# Patient Record
Sex: MALE | Race: WHITE | Employment: OTHER | URBAN - METROPOLITAN AREA
[De-identification: names, ages, dates, MRNs, and addresses within clinical notes are randomized per-mention and may not be internally consistent; named-entity substitution may affect disease eponyms.]

---

## 2017-05-23 ENCOUNTER — HOSPITAL ENCOUNTER (OUTPATIENT)
Dept: OTHER | Age: 42
Discharge: HOME | End: 2017-05-23
Attending: INTERNAL MEDICINE | Admitting: INTERNAL MEDICINE

## 2017-05-23 ENCOUNTER — OFFICE VISIT (OUTPATIENT)
Dept: PULMONOLOGY | Age: 42
End: 2017-05-23

## 2017-05-23 VITALS
HEIGHT: 74 IN | OXYGEN SATURATION: 98 % | DIASTOLIC BLOOD PRESSURE: 80 MMHG | TEMPERATURE: 97.7 F | RESPIRATION RATE: 16 BRPM | WEIGHT: 223.2 LBS | SYSTOLIC BLOOD PRESSURE: 118 MMHG | HEART RATE: 105 BPM | BODY MASS INDEX: 28.64 KG/M2

## 2017-05-23 DIAGNOSIS — J44.9 COPD, SEVERE (HCC): ICD-10-CM

## 2017-05-23 DIAGNOSIS — R06.02 SOB (SHORTNESS OF BREATH): Primary | ICD-10-CM

## 2017-05-23 DIAGNOSIS — Z72.0 TOBACCO ABUSE: ICD-10-CM

## 2017-05-23 DIAGNOSIS — R06.02 BREATH SHORTNESS: ICD-10-CM

## 2017-05-23 PROCEDURE — 99214 OFFICE O/P EST MOD 30 MIN: CPT | Performed by: INTERNAL MEDICINE

## 2017-05-23 RX ORDER — OMEPRAZOLE 40 MG/1
CAPSULE, DELAYED RELEASE ORAL
COMMUNITY
Start: 2017-05-01

## 2017-05-23 RX ORDER — ALBUTEROL SULFATE 90 UG/1
2 AEROSOL, METERED RESPIRATORY (INHALATION) EVERY 6 HOURS PRN
Qty: 1 INHALER | Refills: 5 | Status: SHIPPED | OUTPATIENT
Start: 2017-05-23 | End: 2022-01-24

## 2017-05-23 RX ORDER — BUSPIRONE HYDROCHLORIDE 5 MG/1
TABLET ORAL
COMMUNITY
Start: 2017-05-05 | End: 2022-01-24

## 2017-05-23 RX ORDER — HYDROXYZINE HYDROCHLORIDE 25 MG/1
TABLET, FILM COATED ORAL
COMMUNITY
Start: 2017-05-05 | End: 2022-01-24

## 2017-08-29 ENCOUNTER — TELEPHONE (OUTPATIENT)
Dept: PULMONOLOGY | Age: 42
End: 2017-08-29

## 2019-06-27 ENCOUNTER — HOSPITAL ENCOUNTER (OUTPATIENT)
Dept: GENERAL RADIOLOGY | Age: 44
Discharge: HOME | End: 2019-06-27
Payer: MEDICARE

## 2019-06-27 ENCOUNTER — HOSPITAL ENCOUNTER (OUTPATIENT)
Age: 44
Discharge: HOME | End: 2019-06-27
Payer: MEDICARE

## 2019-06-27 DIAGNOSIS — J44.9 CHRONIC OBSTRUCTIVE PULMONARY DISEASE, UNSPECIFIED COPD TYPE (HCC): ICD-10-CM

## 2019-06-27 PROCEDURE — 71046 X-RAY EXAM CHEST 2 VIEWS: CPT

## 2022-01-24 ENCOUNTER — HOSPITAL ENCOUNTER (EMERGENCY)
Age: 47
Discharge: HOME | End: 2022-01-24
Attending: STUDENT IN AN ORGANIZED HEALTH CARE EDUCATION/TRAINING PROGRAM
Payer: COMMERCIAL

## 2022-01-24 VITALS
SYSTOLIC BLOOD PRESSURE: 151 MMHG | TEMPERATURE: 97.6 F | DIASTOLIC BLOOD PRESSURE: 96 MMHG | HEART RATE: 96 BPM | HEIGHT: 74 IN | WEIGHT: 215 LBS | RESPIRATION RATE: 16 BRPM | BODY MASS INDEX: 27.59 KG/M2 | OXYGEN SATURATION: 99 %

## 2022-01-24 DIAGNOSIS — L03.012 PARONYCHIA OF FINGER OF LEFT HAND: Primary | ICD-10-CM

## 2022-01-24 PROCEDURE — 6360000002 HC RX W HCPCS: Performed by: STUDENT IN AN ORGANIZED HEALTH CARE EDUCATION/TRAINING PROGRAM

## 2022-01-24 PROCEDURE — 10060 I&D ABSCESS SIMPLE/SINGLE: CPT

## 2022-01-24 PROCEDURE — 90715 TDAP VACCINE 7 YRS/> IM: CPT | Performed by: STUDENT IN AN ORGANIZED HEALTH CARE EDUCATION/TRAINING PROGRAM

## 2022-01-24 PROCEDURE — 99284 EMERGENCY DEPT VISIT MOD MDM: CPT

## 2022-01-24 PROCEDURE — 90471 IMMUNIZATION ADMIN: CPT | Performed by: STUDENT IN AN ORGANIZED HEALTH CARE EDUCATION/TRAINING PROGRAM

## 2022-01-24 RX ORDER — CEPHALEXIN 500 MG/1
500 CAPSULE ORAL 4 TIMES DAILY
Qty: 20 CAPSULE | Refills: 0 | Status: SHIPPED | OUTPATIENT
Start: 2022-01-24 | End: 2022-01-29

## 2022-01-24 RX ADMIN — TETANUS TOXOID, REDUCED DIPHTHERIA TOXOID AND ACELLULAR PERTUSSIS VACCINE, ADSORBED 0.5 ML: 5; 2.5; 8; 8; 2.5 SUSPENSION INTRAMUSCULAR at 07:25

## 2022-01-24 ASSESSMENT — PAIN SCALES - GENERAL: PAINLEVEL_OUTOF10: 8

## 2022-01-24 ASSESSMENT — PAIN DESCRIPTION - DESCRIPTORS: DESCRIPTORS: DISCOMFORT

## 2022-01-24 ASSESSMENT — PAIN DESCRIPTION - ORIENTATION: ORIENTATION: LEFT

## 2022-01-24 ASSESSMENT — PAIN DESCRIPTION - PAIN TYPE: TYPE: ACUTE PAIN

## 2022-01-24 ASSESSMENT — PAIN DESCRIPTION - PROGRESSION: CLINICAL_PROGRESSION: NOT CHANGED

## 2022-01-24 NOTE — ED PROVIDER NOTES
[facility] EMERGENCY DEPARTMENT      CHIEF COMPLAINT  Finger Pain (Left 3rd finger pain after \"pulling a piece of my fingernail off 10 days ago\")     HISTORY OF PRESENT ILLNESS  Patricio Ji is a 55 y.o. male  who presents to the ED complaining of left third finger pain and swelling. Patient states that approximately 10 days ago, he hit his hand on something at work and a piece of his fingernail started to come off and that he pulled it off. States that since then, he has been having pain in his left third finger as well as associated swelling. States that a few days after this happened, he did have a small amount of drainage from the area when he hit it on something but has not had any other drainage. He denies any other fevers. Unsure when his last tetanus shot was. He denies any other complaints or concerns. No other complaints, modifying factors or associated symptoms. I have reviewed the following from the nursing documentation. Past Medical History:   Diagnosis Date    Anemia     COPD (chronic obstructive pulmonary disease) ([facility] [address] 5/26/2015    Depression     Psychiatric problem     bipolar    Radial styloid tenosynovitis 4/10/2014     Past Surgical History:   Procedure Laterality Date    HAND SURGERY      KNEE SURGERY      KNEE SURGERY  2-21-12    RIGHT KNEE ARTHROSCOPY WITH MEDIAL         Family History   Problem Relation Age of Onset    Heart Disease Father 39        MI    Heart Disease Other      Social History     Socioeconomic History    Marital status:      Spouse name: Not on file    Number of children: Not on file    Years of education: Not on file    Highest education level: Not on file   Occupational History    Not on file   Tobacco Use    Smoking status: Current Every Day Smoker     Packs/day: 1.50     Years: 25.00     Pack years: 37.50     Types: Cigarettes    Smokeless tobacco: Never Used   Substance and Sexual Activity    Alcohol use:  Yes     Alcohol/week: 2.0 standard drinks     Types: 2 Cans of beer per week     Comment: quit 4/3/2013    Drug use: Yes     Types: Marijuana Carleen Ros), IV     Comment: quit 4/3/2013    Sexual activity: Yes     Partners: Female     Comment: wendy cordero   Other Topics Concern    Not on file   Social History Narrative    Not on file     Social Determinants of Health     Financial Resource Strain:     Difficulty of Paying Living Expenses: Not on file   Food Insecurity:     Worried About [email or web address] in the Last Year: Not on file    Katie of Food in the Last Year: Not on file   Transportation Needs:     Lack of Transportation (Medical): Not on file    Lack of Transportation (Non-Medical):  Not on file   Physical Activity:     Days of Exercise per Week: Not on file    Minutes of Exercise per Session: Not on file   Stress:     Feeling of Stress : Not on file   Social Connections:     Frequency of Communication with Friends and Family: Not on file    Frequency of Social Gatherings with Friends and Family: Not on file    Attends Christianity Services: Not on file    Active Member of [address] [place] [address] or Organizations: Not on file    Attends Club or Organization Meetings: Not on file    Marital Status: Not on file   Intimate Partner Violence:     Fear of Current or Ex-Partner: Not on file    Emotionally Abused: Not on file    Physically Abused: Not on file    Sexually Abused: Not on file   Housing Stability:     Unable to Pay for Housing in the Last Year: Not on file    Number of [place] in the Last Year: Not on file    Unstable Housing in the Last Year: Not on file     Current Facility-Administered Medications   Medication Dose Route Frequency Provider Last Rate Last Admin    Tetanus-Diphth-Acell Pertussis (BOOSTRIX) injection 0.5 mL  0.5 mL IntraMUSCular Once Inez Grant MD         Current Outpatient Medications   Medication Sig Dispense Refill    cephALEXin (KEFLEX) 500 MG capsule Take 1 capsule by mouth 4 times daily for 5 days 20 capsule 0    omeprazole (PRILOSEC) 40 MG delayed release capsule       QUEtiapine (SEROQUEL) 200 MG tablet Take 700 mg by mouth nightly        No Known Allergies    REVIEW OF SYSTEMS  10 systems reviewed, pertinent positives per HPI otherwise noted to be negative. PHYSICAL EXAM  BP (!) 151/96   Pulse 96   Temp 97.6 °F (36.4 °C) (Oral)   Resp 16   Ht 6' 2\" (1.88 m)   Wt 215 lb (97.5 kg)   SpO2 99%   BMI 27.60 kg/m²    GENERAL APPEARANCE: Awake and alert. Cooperative. No acute distress. HENT: Normocephalic. Atraumatic  NECK: Supple. EYES: PERRL. EOM's grossly intact. HEART/CHEST: RRR. No murmurs. LUNGS: Respirations unlabored. CTAB. Good air exchange. Speaking comfortably in full sentences. ABDOMEN: No tenderness. Soft. Non-distended. No masses. No organomegaly. No guarding or rebound. MUSCULOSKELETAL: No extremity edema. Compartments soft. No deformity. All extremities neurovascularly intact. Dry skin on hands with multiple areas of cracking. Tenderness palpation, soft tissue swelling, and area of discoloration in the left third lateral paronychium. No significant erythema. No tenderness to palpation over pad of left 3rd finger. SKIN: Warm and dry. No acute rashes. NEUROLOGICAL: Alert and oriented. CN's 2-12 intact. No gross facial drooping. Strength 5/5, sensation intact. Gait normal.  PSYCHIATRIC: Normal mood and affect. LABS  I have reviewed all labs for this visit. No results found for this visit on 01/24/22. RADIOLOGY  No orders to display     ED COURSE/MDM  Patient seen and evaluated. Old records reviewed. Labs and imaging reviewed and results discussed with patient. Patient is a 80-year-old male presenting with 10-day history of left third finger pain. Full HPI as detailed above. Upon arrival in the ED, vitals reassuring. Patient is resting comfortably is no acute distress.   She does have evidence of a left third paronychia and, with discoloration adjacent to the nailbed. Patient requesting local anesthetic prior to drainage, area was injected with approximately 1 cc of 2% lidocaine without epinephrine. An 18-gauge needle was then inserted into the left third paronychia adjacent to the nail fold with expression of moderate amount of purulent drainage. There is no evidence of overlying erythema, however given the amount of drainage did elect to start the patient on antibiotics. He was given wound care instructions. Tetanus will be updated. Given return precautions for the ED and advised follow-up with PCP. He tolerated the procedure without difficulty. He will be discharged. During the patient's ED course, the patient was given:  Medications   Tetanus-Diphth-Acell Pertussis (BOOSTRIX) injection 0.5 mL (has no administration in time range)        CLINICAL IMPRESSION  1. Paronychia of finger of left hand        Blood pressure (!) 151/96, pulse 96, temperature 97.6 °F (36.4 °C), temperature source Oral, resp. rate 16, height 6' 2\" (1.88 m), weight 215 lb (97.5 kg), SpO2 99 %. [address] was discharged to home in good condition. Patient was given scripts for the following medications. I counseled patient how to take these medications. New Prescriptions    CEPHALEXIN (KEFLEX) 500 MG CAPSULE    Take 1 capsule by mouth 4 times daily for 5 days       Follow-up with:  Parminder Pratt PA-C  [address] [place] [address] [facility] [address] [place] [address]  [phone]    Schedule an appointment as soon as possible for a visit   As needed    [id number] [facility]  [address] [place] [address],[address]  [phone]  Go to   If symptoms worsen      DISCLAIMER: This chart was created using Dragon dictation software. Efforts were made by me to ensure accuracy, however some errors may be present due to limitations of this technology and occasionally words are not transcribed correctly.        Muna Yousif MD  01/24/22 7031

## 2022-09-02 ENCOUNTER — TELEPHONE (OUTPATIENT)
Dept: PULMONOLOGY | Age: 47
End: 2022-09-02

## 2023-02-08 ENCOUNTER — HOSPITAL ENCOUNTER (OUTPATIENT)
Age: 48
Discharge: HOME | End: 2023-02-08
Payer: COMMERCIAL

## 2023-02-08 ENCOUNTER — HOSPITAL ENCOUNTER (OUTPATIENT)
Dept: GENERAL RADIOLOGY | Age: 48
Discharge: HOME | End: 2023-02-08
Payer: COMMERCIAL

## 2023-02-08 ENCOUNTER — OFFICE VISIT (OUTPATIENT)
Dept: PULMONOLOGY | Age: 48
End: 2023-02-08
Payer: COMMERCIAL

## 2023-02-08 VITALS
RESPIRATION RATE: 21 BRPM | HEART RATE: 103 BPM | DIASTOLIC BLOOD PRESSURE: 89 MMHG | WEIGHT: 175 LBS | BODY MASS INDEX: 22.46 KG/M2 | OXYGEN SATURATION: 99 % | SYSTOLIC BLOOD PRESSURE: 139 MMHG | HEIGHT: 74 IN

## 2023-02-08 DIAGNOSIS — R06.02 SOB (SHORTNESS OF BREATH): ICD-10-CM

## 2023-02-08 DIAGNOSIS — R06.02 SOB (SHORTNESS OF BREATH): Primary | ICD-10-CM

## 2023-02-08 LAB — EOSINOPHILS ABSOLUTE COUNT: 0.2 K/UL (ref 0–0.6)

## 2023-02-08 PROCEDURE — 71046 X-RAY EXAM CHEST 2 VIEWS: CPT

## 2023-02-08 PROCEDURE — 99204 OFFICE O/P NEW MOD 45 MIN: CPT | Performed by: INTERNAL MEDICINE

## 2023-02-08 PROCEDURE — 4004F PT TOBACCO SCREEN RCVD TLK: CPT | Performed by: INTERNAL MEDICINE

## 2023-02-08 PROCEDURE — 85048 AUTOMATED LEUKOCYTE COUNT: CPT

## 2023-02-08 PROCEDURE — 36415 COLL VENOUS BLD VENIPUNCTURE: CPT

## 2023-02-08 PROCEDURE — 82103 ALPHA-1-ANTITRYPSIN TOTAL: CPT

## 2023-02-08 PROCEDURE — G8427 DOCREV CUR MEDS BY ELIG CLIN: HCPCS | Performed by: INTERNAL MEDICINE

## 2023-02-08 PROCEDURE — 82104 ALPHA-1-ANTITRYPSIN PHENO: CPT

## 2023-02-08 PROCEDURE — G8420 CALC BMI NORM PARAMETERS: HCPCS | Performed by: INTERNAL MEDICINE

## 2023-02-08 PROCEDURE — 82785 ASSAY OF IGE: CPT

## 2023-02-08 PROCEDURE — G8484 FLU IMMUNIZE NO ADMIN: HCPCS | Performed by: INTERNAL MEDICINE

## 2023-02-08 RX ORDER — ALBUTEROL SULFATE 90 UG/1
2 AEROSOL, METERED RESPIRATORY (INHALATION) EVERY 4 HOURS PRN
Qty: 18 G | Refills: 6 | Status: SHIPPED | OUTPATIENT
Start: 2023-02-08

## 2023-02-08 RX ORDER — LISINOPRIL 10 MG/1
10 TABLET ORAL DAILY
COMMUNITY

## 2023-02-08 NOTE — PROGRESS NOTES
P  [facility]                                               Pulmonary Clinic Consult     I had the pleasure of seeing  Ivy Humphreys     Chief Complaint   Patient presents with    New Patient     NPT ref by CHANDLER Santo       HISTORY OF PRESENT ILLNESS:    Ivy Humphreys is a 50y.o. year old  Who start smoking at age 13  And increase gradually 1 pack daily   And has about 30 PPY     The Patient comes in with SOB that has been going on  the last few years as he had pneumothorax in 2017 and he had VATS right side     He states SOB Associated with cough ,He states that it get worse with exercise or walking long distance and he can walk . 1/2 block And go 1 flight of stairs before get short winded  He cough with yellow to green sputum   No hemoptysis     He  use no inhalers   Was on Trelegy           ALLERGIES:  No Known Allergies    PAST MEDICAL HISTORY:       Diagnosis Date    Anemia     COPD (chronic obstructive pulmonary disease) ([facility] [address] 5/26/2015    Depression     Psychiatric problem     bipolar    Radial styloid tenosynovitis 4/10/2014       MEDICATIONS:   Current Outpatient Medications   Medication Sig Dispense Refill    lisinopril (PRINIVIL;ZESTRIL) 10 MG tablet Take 10 mg by mouth daily      omeprazole (PRILOSEC) 40 MG delayed release capsule       QUEtiapine (SEROQUEL) 200 MG tablet Take 700 mg by mouth nightly        No current facility-administered medications for this visit.        SOCIAL AND OCCUPATIONAL HEALTH:  Social History     Tobacco Use   Smoking Status Every Day    Packs/day: 1.50    Years: 25.00    Pack years: 37.50    Types: Cigarettes   Smokeless Tobacco Never     TB :no   Pets   Industrial exposure:no   Birds :no     SURGICAL HISTORY:   Past Surgical History:   Procedure Laterality Date    HAND SURGERY      KNEE SURGERY      KNEE SURGERY  2-21-12    RIGHT KNEE ARTHROSCOPY WITH MEDIAL           FAMILY HISTORY:   Lung cancer:no   DVT or PE no     REVIEW OF SYSTEMS:  Constitutional: General health is good . There has been no weight changes. No fevers, fatigue or weakness. Head: Patient denies any history of trauma, convulsive disorder or syncope. Skin:  Patient denies history of changes in pigmentation, eruptions or pruritus. No easy bruising or bleeding. EENT: no nasal congestion   Cardiovascular ,No chest pain ,No edema ,  Respiration:SOB:  +,CAIN :+  Gastrointestinal:No GI bleed ,no melena  ,no hematemesis    Musculoskeletal: no joint pain ,no swelling  Neurological:no , syncope. Denies twitching, convulsions, loss of consciousness or memory. Endocrine:  . No history of goiter, exophthalmos or dryness of skin. The patient has no history of diabetes. Hematopoietic:  No history of bleeding disorders or easy bruising. Rheumatic:  No connective tissue disease history or polyarthritis/inflammatory joint disease. PHYSICAL EXAMINATION:  Vitals:    02/08/23 0931   BP: 139/89   Pulse: (!) 103   Resp: 21   SpO2: 99%     Constitutional: This is a well developed, well nourished 50y.o. year old male who is alert, oriented, cooperative and in no apparent distress. Head was normocephalic and atraumatic. EENT: Mallampati class :  Extraocular muscles intact. External canals are patent and no discharge was appreciated. Septum was midline,   mucosa was without erythema, exudates or cobblestoning. No thrush was noted. Neck: Supple without thyromegaly. No elevated JVP. Trachea was midline. No carotid bruits were auscultated. Respiratory: Rhonchi     Cardiovascular: Regular without murmur, clicks, gallops or rubs. There is no left or right ventricular heave. Pulses:  Carotid, radial and femoral pulses were equally bilaterally. Abdomen: Slightly rounded and soft without organomegaly. No rebound, rigidity or guarding was appreciated. Lymphatic: No lymphadenopathy. Musculoskeletal: no joint defrmity .     Extremities: no edema  Skin:  Warm and dry. Good color, turgor and pigmentation. No lesions or scars. Neurological/Psychiatric: The patient's general behavior, level of consciousness, thought content and emotional status is normal.  Cranial nerves II-XII are intact. DATA:   Ordred      IMPRESSION:    1-pneumatothorax s/p VATS 2017   2-COPD unknown stage   3-Cough   4-smoking                PLAN:      -will start albuterol   -will get PFT and if copd ,will go back trelegy  -will get CXR today and may need CT  -6 minutes walk  - alpha 1 antitrypsin   _ eosnophilic test  _ IgE     Flu and Pneumovax     Thank you for allowing me to participate in [facility]. I will keep following with you ,should you have any concerns ,please contact us at [place] pulmonary office     Sincerely,        Anoop Galvez MD  Pulmonary & Critical Care Medicine     NOTE: This report was transcribed using voice recognition software. Every effort was made to ensure accuracy; however, inadvertent computerized transcription errors may be present.

## 2023-02-10 LAB — IGE: 372 KU/L

## 2023-02-11 LAB
ALPHA-1 ANTITRYPSIN PHENOTYPE: NORMAL
ALPHA-1 ANTITRYPSIN: 163 MG/DL (ref 90–200)

## 2023-02-20 ENCOUNTER — HOSPITAL ENCOUNTER (OUTPATIENT)
Dept: PULMONOLOGY | Age: 48
Discharge: HOME | End: 2023-02-20
Payer: COMMERCIAL

## 2023-02-20 DIAGNOSIS — R06.02 SOB (SHORTNESS OF BREATH): ICD-10-CM

## 2023-02-20 LAB
DLCO %PRED: 67 %
DLCO PRED: NORMAL
DLCO/VA %PRED: NORMAL
DLCO/VA PRED: NORMAL
DLCO/VA: NORMAL
DLCO: NORMAL
EXPIRATORY TIME-POST: NORMAL
EXPIRATORY TIME: NORMAL
FEF 25-75% %CHNG: NORMAL
FEF 25-75% %PRED-POST: NORMAL
FEF 25-75% %PRED-PRE: NORMAL
FEF 25-75% PRED: NORMAL
FEF 25-75%-POST: NORMAL
FEF 25-75%-PRE: NORMAL
FEV1 %PRED-POST: 89 %
FEV1 %PRED-PRE: 88 %
FEV1 PRED: NORMAL
FEV1-POST: NORMAL
FEV1-PRE: NORMAL
FEV1/FVC %PRED-POST: NORMAL
FEV1/FVC %PRED-PRE: NORMAL
FEV1/FVC PRED: NORMAL
FEV1/FVC-POST: 68 %
FEV1/FVC-PRE: 67 %
FVC %PRED-POST: NORMAL
FVC %PRED-PRE: NORMAL
FVC PRED: NORMAL
FVC-POST: NORMAL
FVC-PRE: NORMAL
GAW %PRED: NORMAL
GAW PRED: NORMAL
GAW: NORMAL
IC %PRED: NORMAL
IC PRED: NORMAL
IC: NORMAL
MEP: NORMAL
MIP: NORMAL
MVV %PRED-PRE: NORMAL
MVV PRED: NORMAL
MVV-PRE: NORMAL
PEF %PRED-POST: NORMAL
PEF %PRED-PRE: NORMAL
PEF PRED: NORMAL
PEF%CHNG: NORMAL
PEF-POST: NORMAL
PEF-PRE: NORMAL
RAW %PRED: NORMAL
RAW PRED: NORMAL
RAW: NORMAL
RV %PRED: NORMAL
RV PRED: NORMAL
RV: NORMAL
SVC %PRED: NORMAL
SVC PRED: NORMAL
SVC: NORMAL
TLC %PRED: 96 %
TLC PRED: NORMAL
TLC: NORMAL
VA %PRED: NORMAL
VA PRED: NORMAL
VA: NORMAL
VTG %PRED: NORMAL
VTG PRED: NORMAL
VTG: NORMAL

## 2023-02-20 PROCEDURE — 94729 DIFFUSING CAPACITY: CPT

## 2023-02-20 PROCEDURE — 6370000000 HC RX 637 (ALT 250 FOR IP): Performed by: INTERNAL MEDICINE

## 2023-02-20 PROCEDURE — 94726 PLETHYSMOGRAPHY LUNG VOLUMES: CPT

## 2023-02-20 PROCEDURE — 94618 PULMONARY STRESS TESTING: CPT

## 2023-02-20 PROCEDURE — 94640 AIRWAY INHALATION TREATMENT: CPT

## 2023-02-20 PROCEDURE — 94060 EVALUATION OF WHEEZING: CPT

## 2023-02-20 RX ORDER — ALBUTEROL SULFATE 90 UG/1
4 AEROSOL, METERED RESPIRATORY (INHALATION) ONCE
Status: COMPLETED | OUTPATIENT
Start: 2023-02-20 | End: 2023-02-20

## 2023-02-20 RX ADMIN — Medication 4 PUFF: at 09:19

## 2023-02-20 ASSESSMENT — PULMONARY FUNCTION TESTS
FEV1/FVC_POST: 68
FEV1/FVC_PRE: 67
FEV1_PERCENT_PREDICTED_PRE: 88
FEV1_PERCENT_PREDICTED_POST: 89

## 2023-02-20 NOTE — PROCEDURES
[address]                 [address] [address]                               PULMONARY FUNCTION    PATIENT NAME: Angelica Michael                     :        1975  MED REC NO:   [id number]                          ROOM:  ACCOUNT NO:   [de-identified]                           ADMIT DATE: 2023  PROVIDER:     Demetrice Wilde MD    DATE OF PROCEDURE:  2023    INDICATION:  Shortness of breath. FINDINGS:  1. Spirometry revealed evidence of mild obstructive defect. FEV1 is  3.97 liters, which is 88% of predicted. There is no significant  response to bronchodilators. FEV1/FVC ratio of 67%. 2.  Lung volume revealed normal total lung capacity of 7.75 liters,  which is 96% of predicted. Evidence of air trapping. Residual volume  of 3.89 liters, which is 170% predicted. 3.  Diffusion capacity is mildly decreased at 24.66, which is 67% of  predicted. 4.  Flow volume loops suggestive of obstructive defect. 5.  Six-minute walk was done per [facility] protocol. The  patient was able to walk 1260 feet. Saturation on room air at rest was  99% with a heart rate of 80. No significant desaturation on exertion. Maximum heart rate of 98. CONCLUSION:  1. Mild obstructive defect with air trapping and mildly decreased  diffusion capacity. 2.  Six-minute walk with no significant desaturation.         Toñito Delong MD    D: 2023 12:55:23       T: 2023 14:49:27     [id number]  Job#: [id number]     Doc#: [id number]    CC:

## 2023-02-28 ENCOUNTER — OFFICE VISIT (OUTPATIENT)
Dept: PULMONOLOGY | Age: 48
End: 2023-02-28
Payer: COMMERCIAL

## 2023-02-28 VITALS
HEART RATE: 59 BPM | OXYGEN SATURATION: 98 % | HEIGHT: 74 IN | RESPIRATION RATE: 16 BRPM | SYSTOLIC BLOOD PRESSURE: 129 MMHG | WEIGHT: 173 LBS | BODY MASS INDEX: 22.2 KG/M2 | DIASTOLIC BLOOD PRESSURE: 91 MMHG

## 2023-02-28 DIAGNOSIS — R91.1 LUNG NODULE: ICD-10-CM

## 2023-02-28 DIAGNOSIS — J44.9 CHRONIC OBSTRUCTIVE PULMONARY DISEASE, UNSPECIFIED COPD TYPE (HCC): Primary | ICD-10-CM

## 2023-02-28 PROCEDURE — G8427 DOCREV CUR MEDS BY ELIG CLIN: HCPCS | Performed by: INTERNAL MEDICINE

## 2023-02-28 PROCEDURE — G8420 CALC BMI NORM PARAMETERS: HCPCS | Performed by: INTERNAL MEDICINE

## 2023-02-28 PROCEDURE — 3023F SPIROM DOC REV: CPT | Performed by: INTERNAL MEDICINE

## 2023-02-28 PROCEDURE — 99406 BEHAV CHNG SMOKING 3-10 MIN: CPT | Performed by: INTERNAL MEDICINE

## 2023-02-28 PROCEDURE — G8484 FLU IMMUNIZE NO ADMIN: HCPCS | Performed by: INTERNAL MEDICINE

## 2023-02-28 PROCEDURE — 99214 OFFICE O/P EST MOD 30 MIN: CPT | Performed by: INTERNAL MEDICINE

## 2023-02-28 PROCEDURE — 4004F PT TOBACCO SCREEN RCVD TLK: CPT | Performed by: INTERNAL MEDICINE

## 2023-02-28 RX ORDER — NICOTINE 21 MG/24HR
1 PATCH, TRANSDERMAL 24 HOURS TRANSDERMAL EVERY 24 HOURS
Qty: 30 PATCH | Refills: 0 | Status: SHIPPED | OUTPATIENT
Start: 2023-02-28

## 2023-02-28 RX ORDER — FLUTICASONE FUROATE, UMECLIDINIUM BROMIDE AND VILANTEROL TRIFENATATE 100; 62.5; 25 UG/1; UG/1; UG/1
1 POWDER RESPIRATORY (INHALATION) DAILY
Qty: 1 EACH | Refills: 3 | Status: SHIPPED | OUTPATIENT
Start: 2023-02-28

## 2023-02-28 NOTE — PROGRESS NOTES
P  [facility]                                               Pulmonary Clinic Consult     I had the pleasure of seeing  Kathleen Crowley     Chief Complaint   Patient presents with    Follow-up     2-3 wk f/u       HISTORY OF PRESENT ILLNESS:    Kathleen Crowley is a 50y.o. year old  Who start smoking at age 13  And increase gradually 1 pack daily   And has about 30 PPY     The Patient comes in with SOB that has been going on  the last few years as he had pneumothorax in 2017 and he had VATS right side     He states SOB Associated with cough ,He states that it get worse with exercise or walking long distance and he can walk . 1/2 block And go 1 flight of stairs before get short winded  He cough with yellow to green sputum   No hemoptysis     He  use no inhalers   Was on Trelegy     Today  visit   He still with SOB and CAIN  Some wheezing at night  Still smoke ,states he is under stress      ALLERGIES:  No Known Allergies    PAST MEDICAL HISTORY:       Diagnosis Date    Anemia     COPD (chronic obstructive pulmonary disease) ([facility] [address] 5/26/2015    Depression     Psychiatric problem     bipolar    Radial styloid tenosynovitis 4/10/2014       MEDICATIONS:   Current Outpatient Medications   Medication Sig Dispense Refill    albuterol sulfate HFA (PROVENTIL HFA) 108 (90 Base) MCG/ACT inhaler Inhale 2 puffs into the lungs every 4 hours as needed for Wheezing or Shortness of Breath 18 g 6    omeprazole (PRILOSEC) 40 MG delayed release capsule       QUEtiapine (SEROQUEL) 200 MG tablet Take 700 mg by mouth nightly       lisinopril (PRINIVIL;ZESTRIL) 10 MG tablet Take 10 mg by mouth daily (Patient not taking: Reported on 2/28/2023)       No current facility-administered medications for this visit.        SOCIAL AND OCCUPATIONAL HEALTH:  Social History     Tobacco Use   Smoking Status Every Day    Packs/day: 1.00    Years: 25.00    Pack years: 25.00    Types: Cigarettes   Smokeless Tobacco Never   Tobacco Comments    States he is quitting today      TB :no   Pets   Industrial exposure:no   Birds :no     SURGICAL HISTORY:   Past Surgical History:   Procedure Laterality Date    HAND SURGERY      KNEE SURGERY      KNEE SURGERY  2-21-12    RIGHT KNEE ARTHROSCOPY WITH MEDIAL           FAMILY HISTORY:   Lung cancer:no   DVT or PE no     REVIEW OF SYSTEMS:  Constitutional: General health is good . There has been no weight changes. No fevers, fatigue or weakness. Head: Patient denies any history of trauma, convulsive disorder or syncope. Skin:  Patient denies history of changes in pigmentation, eruptions or pruritus. No easy bruising or bleeding. EENT: no nasal congestion   Cardiovascular ,No chest pain ,No edema ,  Respiration:SOB:  +,CAIN :+  Gastrointestinal:No GI bleed ,no melena  ,no hematemesis    Musculoskeletal: no joint pain ,no swelling  Neurological:no , syncope. Denies twitching, convulsions, loss of consciousness or memory. Endocrine:  . No history of goiter, exophthalmos or dryness of skin. The patient has no history of diabetes. Hematopoietic:  No history of bleeding disorders or easy bruising. Rheumatic:  No connective tissue disease history or polyarthritis/inflammatory joint disease. PHYSICAL EXAMINATION:  Vitals:    02/28/23 0924   BP: (!) 129/91   Pulse: 59   Resp: 16   SpO2: 98%     Constitutional: This is a well developed, well nourished 50y.o. year old male who is alert, oriented, cooperative and in no apparent distress. Head was normocephalic and atraumatic. EENT: Mallampati class :  Extraocular muscles intact. External canals are patent and no discharge was appreciated. Septum was midline,   mucosa was without erythema, exudates or cobblestoning. No thrush was noted. Neck: Supple without thyromegaly. No elevated JVP. Trachea was midline. No carotid bruits were auscultated.     Respiratory: Rhonchi     Cardiovascular: Regular without murmur, clicks, gallops or rubs.  There is no left or right ventricular heave.    Pulses:  Carotid, radial and femoral pulses were equally bilaterally.    Abdomen: Slightly rounded and soft without organomegaly. No rebound, rigidity or guarding was appreciated.    Lymphatic: No lymphadenopathy.  Musculoskeletal: no joint defrmity .    Extremities: no edema  Skin:  Warm and dry.  Good color, turgor and pigmentation. No lesions or scars.  Neurological/Psychiatric: The patient's general behavior, level of consciousness, thought content and emotional status is normal.  Cranial nerves II-XII are intact.      DATA:   Ordred      IMPRESSION:    1-pneumatothorax s/p VATS 2017   2-mild COPD   3-Hyper    3-Cough   4-smoking                PLAN:      PFT shows mild COPD ,no hypoxia   Start trelegy   -will start albuterol   -will get PFT and if copd ,will go back trelegy  -will get CXR today and may need CT  -6 minutes walk  - alpha 1 antitrypsin normal   _ eosnophilic test 0.2   _ IgE 372   Will get CT to assess lung nodule/consolidation left upper lobe   Nicotine     I spent 4-6 minutes counseling patient regarding smoking and the risk of Lung cancer and COPD and respiratory failure   Ready to quit: Not Answered  Counseling given: Not Answered  Tobacco comments: States he is quitting today  ,started ncotine patches    Flu and Pneumovax     Thank you for allowing me to participate [facility]. I will keep following with you ,should you have any concerns ,please contact us at [place] pulmonary office     Sincerely,        Eloise Hodge MD  Pulmonary & Critical Care Medicine     NOTE: This report was transcribed using voice recognition software. Every effort was made to ensure accuracy; however, inadvertent computerized transcription errors may be present.

## 2023-05-25 ENCOUNTER — TELEPHONE (OUTPATIENT)
Dept: PULMONOLOGY | Age: 48
End: 2023-05-25

## 2023-05-25 NOTE — TELEPHONE ENCOUNTER
Patient did not show for 3 month ct f/u COPD/pulm nodule (PT NO SHOWED TO CT DID NOT COMPLETE) appointment  with Dr. Mary Arnold on 5/25/23    Same Day Cancellation: No    Patient rescheduled:  No    New appointment: NA    Patient was also no show on: NA    LOV 2/28/23   IMPRESSION:    1-pneumatothorax s/p VATS 2017   2-mild COPD   3-Hyper IGE 56   3-Cough   4-smoking                 PLAN:      PFT shows mild COPD ,no hypoxia   Start trelegy   -will start albuterol   -will get PFT and if copd ,will go back trelegy  -will get CXR today and may need CT  -6 minutes walk  - alpha 1 antitrypsin normal   _ eosnophilic test 0.2   _ IgE 372   Will get CT to assess lung nodule/consolidation left upper lobe   Nicotine